# Patient Record
Sex: FEMALE | Race: WHITE | NOT HISPANIC OR LATINO | URBAN - METROPOLITAN AREA
[De-identification: names, ages, dates, MRNs, and addresses within clinical notes are randomized per-mention and may not be internally consistent; named-entity substitution may affect disease eponyms.]

---

## 2022-05-12 ENCOUNTER — EMERGENCY (EMERGENCY)
Facility: HOSPITAL | Age: 37
LOS: 1 days | Discharge: HOME | End: 2022-05-12
Attending: EMERGENCY MEDICINE
Payer: COMMERCIAL

## 2022-05-12 VITALS
SYSTOLIC BLOOD PRESSURE: 151 MMHG | TEMPERATURE: 98 F | OXYGEN SATURATION: 100 % | DIASTOLIC BLOOD PRESSURE: 80 MMHG | HEART RATE: 83 BPM | RESPIRATION RATE: 18 BRPM

## 2022-05-12 PROCEDURE — 73080 X-RAY EXAM OF ELBOW: CPT

## 2022-05-12 PROCEDURE — 29105 APPLICATION LONG ARM SPLINT: CPT

## 2022-05-12 PROCEDURE — 24560 CLTX HUM EPCNDYLR FX WO MNPJ: CPT | Mod: 54

## 2022-05-12 PROCEDURE — 99284 EMERGENCY DEPT VISIT MOD MDM: CPT | Mod: 57

## 2022-05-12 PROCEDURE — 99283 EMERGENCY DEPT VISIT LOW MDM: CPT | Mod: 25

## 2022-05-12 PROCEDURE — 96372 THER/PROPH/DIAG INJ SC/IM: CPT | Mod: XU

## 2022-05-12 PROCEDURE — 73080 X-RAY EXAM OF ELBOW: CPT | Mod: 26,RT

## 2022-05-12 RX ORDER — IBUPROFEN 200 MG
1 TABLET ORAL
Qty: 28 | Refills: 0
Start: 2022-05-12 | End: 2022-05-18

## 2022-05-12 RX ORDER — KETOROLAC TROMETHAMINE 30 MG/ML
30 SYRINGE (ML) INJECTION ONCE
Refills: 0 | Status: DISCONTINUED | OUTPATIENT
Start: 2022-05-12 | End: 2022-05-12

## 2022-05-12 RX ADMIN — Medication 30 MILLIGRAM(S): at 07:43

## 2022-05-12 NOTE — ED PROVIDER NOTE - PATIENT PORTAL LINK FT
You can access the FollowMyHealth Patient Portal offered by [facility] by registering at the following website: [email or web address]. By joining [facility]’s FollowMyHealth portal, you will also be able to view your health information using other applications (apps) compatible with our system.

## 2022-05-12 NOTE — ED PROVIDER NOTE - CLINICAL SUMMARY MEDICAL DECISION MAKING FREE TEXT BOX
36yo F presenting with right lateral elbow pain after lifting weights Monday. suspect lateral epicondylitis. will check xray, give nsaids

## 2022-05-12 NOTE — ED PROVIDER NOTE - NS ED ATTENDING STATEMENT MOD
This was a shared visit with the JOSTIN. I reviewed and verified the documentation and independently performed the documented:

## 2022-05-12 NOTE — ED PROVIDER NOTE - OBJECTIVE STATEMENT
38yo F no pmhx presents to ED c/o right elbow pain onset yesterday. States she was lifting weights Monday evening, doing bicep and tricep workouts and felt fine afterwards however began feeling pain yesterday to right lateral elbow. States she cannot extend arm fully 2/2 pain. Took tylenol and aleve yesterday without significant improvement. No fall or direct trauma. Denies numbness, tingling, weakness, wrist pain, shoulder pain, neck pain.

## 2022-05-12 NOTE — ED PROVIDER NOTE - ATTENDING APP SHARED VISIT CONTRIBUTION OF CARE
38 yo F p/w right elbow pain after working out (is usually active but did not work out for a while due to covid infection in April 2022) was at [employer] and used 5 lb weights doing biceps curls. no falls or trauma    vss   ttp to elbow on right  unable to supinate   soft compartments  neurovasc intact pre and post splint    xr showing fx thru r elbow  placed in splint  dc w pmd and ortho f/u    Based on the H&P, I do not suspect any life- / limb- threatening pathology that requires further intervention at this time.

## 2022-05-12 NOTE — ED PROVIDER NOTE - CARE PROVIDER_API CALL
Zahra Ruiz (DO)  Orthopaedic Surgery  [address] [place] [address]  Phone: [phone]  Fax: [phone]  Follow Up Time:

## 2022-05-12 NOTE — ED PROVIDER NOTE - NS ED ROS FT
Gen: denies fever, chills, fatigue, weight loss  Skin: denies rashes, laceration, bruising  HEENT: denies visual changes, ear pain, nasal congestion, throat pain  MSK: +R elbow pain and swelling. Denies back pain, neck pain  Neuro: denies headache, dizziness, weakness, numbness

## 2022-05-12 NOTE — ED PROVIDER NOTE - PHYSICAL EXAMINATION
Gen: No acute distress, non toxic  HEENT: Mucous membranes moist, pink conjunctivae, EOMI  Neuro: A&O x 3, 5/5 str b/l UE, sensation intact to light touch  MSK: +Swelling to right lateral elbow. arm held fixed at 90 degrees. Pain with passive extension/flexion and supination. +TTP over lateral epicondyle. compartments soft/compressible   Vasc: Radial pulses 2+ b/l   Skin: No rashes. intact and perfused.

## 2022-05-12 NOTE — ED ADULT TRIAGE NOTE - CHIEF COMPLAINT QUOTE
Presents to ED c/o right arm pain. States that she was lifting weights on Monday and on Tuesday noticed that she began to have pain by her elbow. Came into ED today because she thought the right arm was more "puffy" than the left.

## 2022-05-12 NOTE — ED PROCEDURE NOTE - ATTENDING APP SHARED VISIT CONTRIBUTION OF CARE
I, the attending physician, was present with the PA/NP/resident for the key portions of the procedure.  Annie Tubbs, DO

## 2022-05-12 NOTE — ED PROVIDER NOTE - NSFOLLOWUPINSTRUCTIONS_ED_ALL_ED_FT
- May apply ice to affected areas 10-15 minutes at a time, multiple times per day. You may use as frequently as desired.  - May take ibuprofen 600mg every 6 hours as needed for pain control  - Elevate extremity while resting   - Rest affected area, avoid heavy lifting, exertion  - Remain in splint until evaluated by orthopedics.   - Follow-up with orthopedic doctor provided within 1 week for further evaluation     Fracture    A fracture is a break in one of your bones. This can occur from a variety of injuries, especially traumatic ones. Symptoms include pain, bruising, or swelling. Do not use the injured limb. If a fracture is in one of the bones below your waist, do not put weight on that limb unless instructed to do so by your healthcare provider. Crutches or a cane may have been provided. A splint or cast may have been applied by your health care provider. Make sure to keep it dry and follow up with an orthopedist as instructed.    SEEK IMMEDIATE MEDICAL CARE IF YOU HAVE ANY OF THE FOLLOWING SYMPTOMS: numbness, tingling, increasing pain, or weakness in any part of the injured limb.